# Patient Record
Sex: FEMALE | Race: WHITE | Employment: FULL TIME | URBAN - NONMETROPOLITAN AREA
[De-identification: names, ages, dates, MRNs, and addresses within clinical notes are randomized per-mention and may not be internally consistent; named-entity substitution may affect disease eponyms.]

---

## 2017-11-30 ENCOUNTER — HOSPITAL ENCOUNTER (OUTPATIENT)
Age: 22
Discharge: HOME | End: 2017-11-30
Payer: COMMERCIAL

## 2017-11-30 LAB
ALBUMIN SERPL-MCNC: 4.2 G/DL (ref 3.5–5.1)
ALP BLD-CCNC: 64 U/L (ref 38–126)
ALT SERPL-CCNC: 12 U/L (ref 11–66)
ANION GAP SERPL CALCULATED.3IONS-SCNC: 14 MEQ/L (ref 8–16)
ANISOCYTOSIS: ABNORMAL
AST SERPL-CCNC: 12 U/L (ref 5–40)
AVERAGE GLUCOSE: 78 MG/DL (ref 70–126)
BASOPHILS # BLD: 0.4 %
BASOPHILS ABSOLUTE: 0 THOU/MM3 (ref 0–0.1)
BILIRUB SERPL-MCNC: 0.6 MG/DL (ref 0.3–1.2)
BUN BLDV-MCNC: 12 MG/DL (ref 7–22)
CALCIUM SERPL-MCNC: 9.3 MG/DL (ref 8.5–10.5)
CHLORIDE BLD-SCNC: 104 MEQ/L (ref 98–111)
CHOLESTEROL, TOTAL: 141 MG/DL (ref 100–199)
CO2: 23 MEQ/L (ref 23–33)
CREAT SERPL-MCNC: 0.7 MG/DL (ref 0.4–1.2)
EOSINOPHIL # BLD: 2 %
EOSINOPHILS ABSOLUTE: 0.1 THOU/MM3 (ref 0–0.4)
GFR SERPL CREATININE-BSD FRML MDRD: > 90 ML/MIN/1.73M2
GLUCOSE BLD-MCNC: 91 MG/DL (ref 70–108)
HBA1C MFR BLD: 4.6 % (ref 4.4–6.4)
HCT VFR BLD CALC: 29.1 % (ref 37–47)
HDLC SERPL-MCNC: 53 MG/DL
HEMOGLOBIN: 9.3 GM/DL (ref 12–16)
HYPOCHROMIA: ABNORMAL
INSULIN, RANDOM OR FASTING: NORMAL
LDL CHOLESTEROL CALCULATED: 75 MG/DL
LYMPHOCYTES # BLD: 29.2 %
LYMPHOCYTES ABSOLUTE: 1.5 THOU/MM3 (ref 1–4.8)
MCH RBC QN AUTO: 25 PG (ref 27–31)
MCHC RBC AUTO-ENTMCNC: 32.1 GM/DL (ref 33–37)
MCV RBC AUTO: 77.7 FL (ref 81–99)
MICROCYTES: ABNORMAL
MONOCYTES # BLD: 8.2 %
MONOCYTES ABSOLUTE: 0.4 THOU/MM3 (ref 0.4–1.3)
NUCLEATED RED BLOOD CELLS: 0 /100 WBC
PDW BLD-RTO: 16.3 % (ref 11.5–14.5)
PLATELET # BLD: 175 THOU/MM3 (ref 130–400)
PMV BLD AUTO: 9.6 MCM (ref 7.4–10.4)
POTASSIUM SERPL-SCNC: 4.1 MEQ/L (ref 3.5–5.2)
PROLACTIN: 13.6 NG/ML
RBC # BLD: 3.74 MILL/MM3 (ref 4.2–5.4)
SEG NEUTROPHILS: 60.2 %
SEGMENTED NEUTROPHILS ABSOLUTE COUNT: 3 THOU/MM3 (ref 1.8–7.7)
SODIUM BLD-SCNC: 141 MEQ/L (ref 135–145)
T4 FREE: 1.43 NG/DL (ref 0.93–1.76)
TOTAL PROTEIN: 7.1 G/DL (ref 6.1–8)
TRIGL SERPL-MCNC: 64 MG/DL (ref 0–199)
TSH SERPL DL<=0.05 MIU/L-ACNC: 2.89 UIU/ML (ref 0.4–4.2)
WBC # BLD: 5 THOU/MM3 (ref 4.8–10.8)

## 2017-11-30 PROCEDURE — 84146 ASSAY OF PROLACTIN: CPT

## 2017-11-30 PROCEDURE — 80061 LIPID PANEL: CPT

## 2017-11-30 PROCEDURE — 83525 ASSAY OF INSULIN: CPT

## 2017-11-30 PROCEDURE — 84439 ASSAY OF FREE THYROXINE: CPT

## 2017-11-30 PROCEDURE — 83036 HEMOGLOBIN GLYCOSYLATED A1C: CPT

## 2017-11-30 PROCEDURE — 80050 GENERAL HEALTH PANEL: CPT

## 2017-11-30 PROCEDURE — 36415 COLL VENOUS BLD VENIPUNCTURE: CPT

## 2018-08-17 ENCOUNTER — HOSPITAL ENCOUNTER (OUTPATIENT)
Age: 23
Discharge: HOME | End: 2018-08-17
Payer: COMMERCIAL

## 2018-08-17 LAB
BASOPHILS # BLD: 0.6 %
BASOPHILS ABSOLUTE: 0 THOU/MM3 (ref 0–0.1)
EOSINOPHIL # BLD: 2.3 %
EOSINOPHILS ABSOLUTE: 0.1 THOU/MM3 (ref 0–0.4)
ERYTHROCYTE [DISTWIDTH] IN BLOOD BY AUTOMATED COUNT: 12.8 % (ref 11.5–14.5)
ERYTHROCYTE [DISTWIDTH] IN BLOOD BY AUTOMATED COUNT: 44.7 FL (ref 35–45)
GLUCOSE FASTING: 91 MG/DL (ref 70–108)
HCT VFR BLD CALC: 45 % (ref 37–47)
HEMOGLOBIN: 15.4 GM/DL (ref 12–16)
IMMATURE GRANS (ABS): 0.01 THOU/MM3 (ref 0–0.07)
IMMATURE GRANULOCYTES: 0.2 %
INSULIN, RANDOM OR FASTING: NORMAL
LYMPHOCYTES # BLD: 32.1 %
LYMPHOCYTES ABSOLUTE: 1.7 THOU/MM3 (ref 1–4.8)
MCH RBC QN AUTO: 32.7 PG (ref 26–33)
MCHC RBC AUTO-ENTMCNC: 34.2 GM/DL (ref 32.2–35.5)
MCV RBC AUTO: 95.5 FL (ref 81–99)
MONOCYTES # BLD: 7.6 %
MONOCYTES ABSOLUTE: 0.4 THOU/MM3 (ref 0.4–1.3)
NUCLEATED RED BLOOD CELLS: 0 /100 WBC
PLATELET # BLD: 162 THOU/MM3 (ref 130–400)
PMV BLD AUTO: 11.5 FL (ref 9.4–12.4)
RBC # BLD: 4.71 MILL/MM3 (ref 4.2–5.4)
SEG NEUTROPHILS: 57.2 %
SEGMENTED NEUTROPHILS ABSOLUTE COUNT: 3 THOU/MM3 (ref 1.8–7.7)
WBC # BLD: 5.2 THOU/MM3 (ref 4.8–10.8)

## 2018-08-17 PROCEDURE — 36415 COLL VENOUS BLD VENIPUNCTURE: CPT

## 2018-08-17 PROCEDURE — 85025 COMPLETE CBC W/AUTO DIFF WBC: CPT

## 2018-08-17 PROCEDURE — 83525 ASSAY OF INSULIN: CPT

## 2018-08-17 PROCEDURE — 82947 ASSAY GLUCOSE BLOOD QUANT: CPT

## 2022-04-13 ENCOUNTER — HOSPITAL ENCOUNTER (EMERGENCY)
Age: 27
Discharge: HOME | End: 2022-04-13
Attending: EMERGENCY MEDICINE
Payer: COMMERCIAL

## 2022-04-13 ENCOUNTER — APPOINTMENT (OUTPATIENT)
Dept: CT IMAGING | Age: 27
End: 2022-04-13
Payer: COMMERCIAL

## 2022-04-13 VITALS
TEMPERATURE: 96.3 F | DIASTOLIC BLOOD PRESSURE: 61 MMHG | WEIGHT: 270 LBS | RESPIRATION RATE: 16 BRPM | HEART RATE: 90 BPM | BODY MASS INDEX: 38.65 KG/M2 | HEIGHT: 70 IN | SYSTOLIC BLOOD PRESSURE: 110 MMHG | OXYGEN SATURATION: 97 %

## 2022-04-13 DIAGNOSIS — R10.9 RIGHT FLANK PAIN: ICD-10-CM

## 2022-04-13 DIAGNOSIS — N20.1 RIGHT DISTAL URETERAL CALCULUS: ICD-10-CM

## 2022-04-13 DIAGNOSIS — N20.0 KIDNEY STONE: Primary | ICD-10-CM

## 2022-04-13 LAB
ALBUMIN SERPL-MCNC: 3.5 GM/DL (ref 3.4–5)
ALP BLD-CCNC: 49 U/L (ref 46–116)
ALT SERPL-CCNC: 27 U/L (ref 14–63)
AMORPHOUS: ABNORMAL
ANION GAP: 10 MEQ/L (ref 8–16)
AST SERPL-CCNC: 17 U/L (ref 15–37)
BACTERIA: ABNORMAL
BASOPHILS # BLD: 0.7 % (ref 0–3)
BILIRUB SERPL-MCNC: 0.4 MG/DL (ref 0.2–1)
BILIRUBIN URINE: ABNORMAL
BLOOD, URINE: ABNORMAL
BUN BLDV-MCNC: 12 MG/DL (ref 7–18)
CASTS UA: ABNORMAL /LPF
CHARACTER, URINE: ABNORMAL
CHLORIDE BLD-SCNC: 104 MEQ/L (ref 98–107)
CO2: 25 MEQ/L (ref 21–32)
COLOR: YELLOW
CREAT SERPL-MCNC: 1 MG/DL (ref 0.6–1.3)
CRYSTALS, UA: ABNORMAL
EOSINOPHILS RELATIVE PERCENT: 2.2 % (ref 0–4)
EPITHELIAL CELLS, UA: ABNORMAL /HPF
GFR, ESTIMATED: 72 ML/MIN/1.73M2
GLUCOSE BLD-MCNC: 111 MG/DL (ref 74–106)
GLUCOSE, URINE: NEGATIVE MG/DL
HCT VFR BLD CALC: 42.5 % (ref 37–47)
HEMOGLOBIN: 14.7 GM/DL (ref 12–16)
KETONES, URINE: ABNORMAL
LEUKOCYTE ESTERASE, URINE: ABNORMAL
LYMPHOCYTES # BLD: 27 % (ref 15–47)
MAGNESIUM: 2 MG/DL (ref 1.8–2.4)
MCH RBC QN AUTO: 32.6 PG (ref 27–31)
MCHC RBC AUTO-ENTMCNC: 34.5 GM/DL (ref 33–37)
MCV RBC AUTO: 94.4 FL (ref 81–99)
MONOCYTES: 7.3 % (ref 0–12)
MUCUS: ABNORMAL
NITRITE, URINE: NEGATIVE
PDW BLD-RTO: 11.9 % (ref 11.5–14.5)
PH UA: 5.5 (ref 5–9)
PLATELET # BLD: 142 THOU/MM3 (ref 130–400)
PMV BLD AUTO: 8.3 FL (ref 7.4–10.4)
POC CALCIUM: 8.6 MG/DL (ref 8.5–10.1)
POTASSIUM SERPL-SCNC: 3.3 MEQ/L (ref 3.5–5.1)
PREGNANCY, URINE: NEGATIVE
PROTEIN UA: 30 MG/DL
RBC # BLD: 4.5 MILL/MM3 (ref 4.2–5.4)
RBC UA: ABNORMAL /HPF
REFLEX TO URINE C & S: ABNORMAL
SEGS: 62.8 % (ref 43–75)
SODIUM BLD-SCNC: 139 MEQ/L (ref 136–145)
SPECIFIC GRAVITY UA: 1.03 (ref 1–1.03)
TOTAL PROTEIN: 7.3 GM/DL (ref 6.4–8.2)
UROBILINOGEN, URINE: 0.2 EU/DL (ref 0–1)
WBC # BLD: 8 THOU/MM3 (ref 4.8–10.8)
WBC UA: ABNORMAL /HPF

## 2022-04-13 PROCEDURE — 6360000002 HC RX W HCPCS: Performed by: EMERGENCY MEDICINE

## 2022-04-13 PROCEDURE — 80053 COMPREHEN METABOLIC PANEL: CPT

## 2022-04-13 PROCEDURE — 99284 EMERGENCY DEPT VISIT MOD MDM: CPT

## 2022-04-13 PROCEDURE — 74176 CT ABD & PELVIS W/O CONTRAST: CPT

## 2022-04-13 PROCEDURE — 81001 URINALYSIS AUTO W/SCOPE: CPT

## 2022-04-13 PROCEDURE — 96374 THER/PROPH/DIAG INJ IV PUSH: CPT

## 2022-04-13 PROCEDURE — 96375 TX/PRO/DX INJ NEW DRUG ADDON: CPT

## 2022-04-13 PROCEDURE — 84703 CHORIONIC GONADOTROPIN ASSAY: CPT

## 2022-04-13 PROCEDURE — 83735 ASSAY OF MAGNESIUM: CPT

## 2022-04-13 PROCEDURE — 2580000003 HC RX 258: Performed by: EMERGENCY MEDICINE

## 2022-04-13 PROCEDURE — 85025 COMPLETE CBC W/AUTO DIFF WBC: CPT

## 2022-04-13 RX ORDER — KETOROLAC TROMETHAMINE 30 MG/ML
15 INJECTION, SOLUTION INTRAMUSCULAR; INTRAVENOUS ONCE
Status: COMPLETED | OUTPATIENT
Start: 2022-04-13 | End: 2022-04-13

## 2022-04-13 RX ORDER — 0.9 % SODIUM CHLORIDE 0.9 %
1000 INTRAVENOUS SOLUTION INTRAVENOUS ONCE
Status: COMPLETED | OUTPATIENT
Start: 2022-04-13 | End: 2022-04-13

## 2022-04-13 RX ORDER — OXYCODONE HYDROCHLORIDE AND ACETAMINOPHEN 5; 325 MG/1; MG/1
1 TABLET ORAL EVERY 6 HOURS PRN
Qty: 10 TABLET | Refills: 0 | Status: SHIPPED | OUTPATIENT
Start: 2022-04-13 | End: 2022-04-16

## 2022-04-13 RX ADMIN — HYDROMORPHONE HYDROCHLORIDE 1 MG: 1 INJECTION, SOLUTION INTRAMUSCULAR; INTRAVENOUS; SUBCUTANEOUS at 14:35

## 2022-04-13 RX ADMIN — KETOROLAC TROMETHAMINE 15 MG: 30 INJECTION, SOLUTION INTRAMUSCULAR at 14:33

## 2022-04-13 RX ADMIN — SODIUM CHLORIDE 1000 ML: 9 INJECTION, SOLUTION INTRAVENOUS at 14:34

## 2022-04-13 ASSESSMENT — ENCOUNTER SYMPTOMS
VOMITING: 1
SHORTNESS OF BREATH: 0
BLOOD IN STOOL: 0
ABDOMINAL PAIN: 1
SORE THROAT: 0
EYE DISCHARGE: 0
NAUSEA: 1
EYE PAIN: 0
WHEEZING: 0
DIARRHEA: 0

## 2022-04-13 ASSESSMENT — PAIN DESCRIPTION - ORIENTATION: ORIENTATION: RIGHT

## 2022-04-13 ASSESSMENT — PAIN SCALES - GENERAL
PAINLEVEL_OUTOF10: 3
PAINLEVEL_OUTOF10: 8
PAINLEVEL_OUTOF10: 3
PAINLEVEL_OUTOF10: 8

## 2022-04-13 ASSESSMENT — PAIN - FUNCTIONAL ASSESSMENT
PAIN_FUNCTIONAL_ASSESSMENT: 0-10
PAIN_FUNCTIONAL_ASSESSMENT: 0-10

## 2022-04-13 ASSESSMENT — PAIN DESCRIPTION - PAIN TYPE: TYPE: ACUTE PAIN

## 2022-04-13 ASSESSMENT — PAIN DESCRIPTION - LOCATION: LOCATION: ABDOMEN

## 2022-04-13 NOTE — ED NOTES
Pt. Resting quietly on cot, 2/10. Unable to urinate at this time.       Jesus Alberto Ricks RN  04/13/22 1227

## 2022-04-13 NOTE — ED PROVIDER NOTES
[facility]  [address] [address]  Phone: [phone]    eMERGENCY dEPARTMENT eNCOUnter           [address] [facility]       Chief Complaint   Patient presents with    Abdominal Pain    Flank Pain       Nurses Notes reviewed and I agree except as noted in the HPI. HISTORY OF PRESENT ILLNESS    Myrla Holter is a 24 y.o. female who presented via private vehicle with above-mentioned complaints. She presented with 2-hour history of right flank pain. She describes her pain as sharp, severe pain which radiates to the right lower quadrant of the abdomen. Her pain was not triggered exacerbated by anything. She also has 1 week history of mild dysuria urinary frequency. She has no hematuria. She denies fever or chills. She has nausea but no vomiting. She had prior history of UTIs but no kidney stones. REVIEW OF SYSTEMS     Review of Systems   Constitutional: Negative for chills and fever. HENT: Negative for sore throat. Eyes: Negative for pain and discharge. Respiratory: Negative for shortness of breath and wheezing. Cardiovascular: Negative for chest pain and palpitations. Gastrointestinal: Positive for abdominal pain, nausea and vomiting. Negative for blood in stool and diarrhea. Genitourinary: Positive for dysuria, flank pain and frequency. Negative for hematuria. Musculoskeletal: Negative for neck pain and neck stiffness. Neurological: Negative for seizures, syncope and headaches. Psychiatric/Behavioral: Negative for confusion. PAST MEDICAL HISTORY    has no past medical history on file. SURGICAL HISTORY      has no past surgical history on file. CURRENT MEDICATIONS       Previous Medications    AZITHROMYCIN (ZITHROMAX Z-DARA) 250 MG TABLET    500mg x1 day, then 250mg daily x 4 days    METHYLPREDNISOLONE (MEDROL DOSEPACK) 4 MG TABLET    Take as directed. ALLERGIES     is allergic to pcn [penicillins].     FAMILY HISTORY has no family status information on file. family history is not on file. SOCIAL HISTORY      reports that she has never smoked. She has never used smokeless tobacco.    PHYSICAL EXAM     INITIAL VITALS:  height is 5' 10\" (1.778 m) and weight is 270 lb (122.5 kg). Her temperature is 96.3 °F (35.7 °C). Her blood pressure is 110/61 and her pulse is 90. Her respiration is 16 and oxygen saturation is 97%. Physical Exam  Vitals and nursing note reviewed. Constitutional:       General: She is in acute distress. Appearance: She is well-developed. She is diaphoretic. Comments: She looks in obvious pain   HENT:      Head: Atraumatic. Eyes:      Conjunctiva/sclera: Conjunctivae normal.      Pupils: Pupils are equal, round, and reactive to light. Neck:      Thyroid: No thyromegaly. Vascular: No JVD. Trachea: No tracheal deviation. Cardiovascular:      Rate and Rhythm: Normal rate and regular rhythm. Heart sounds: No murmur heard. No friction rub. No gallop. Pulmonary:      Effort: Pulmonary effort is normal.      Breath sounds: Normal breath sounds. Abdominal:      General: Bowel sounds are normal.      Palpations: Abdomen is soft. There is no mass. Tenderness: There is no abdominal tenderness. There is no guarding. Musculoskeletal:      Cervical back: Neck supple. Comments: No CVA tenderness   Neurological:      Mental Status: She is alert. DIFFERENTIAL DIAGNOSIS:       DIAGNOSTIC RESULTS       RADIOLOGY:   CT scan of abdomen and pelvis without contrast showed very small right distal ureteral stone with mild hydronephrosis.   LABS:   Labs Reviewed   URINALYSIS WITH REFLEX TO CULTURE - Abnormal; Notable for the following components:       Result Value    Bilirubin Urine SMALL (*)     Ketones, Urine TRACE (*)     Blood, Urine MODERATE (*)     Protein, UA 30 (*)     Leukocyte Esterase, Urine TRACE (*)     Character, Urine CLOUDY (*)     All other components within normal limits   CBC WITH AUTO DIFFERENTIAL - Abnormal; Notable for the following components:    MCH 32.6 (*)     All other components within normal limits   COMPREHENSIVE METABOLIC PANEL - Abnormal; Notable for the following components:    Glucose 111 (*)     Potassium 3.3 (*)     All other components within normal limits   GLOMERULAR FILTRATION RATE, ESTIMATED - Abnormal; Notable for the following components:    GFR, Estimated 72 (*)     All other components within normal limits   PREGNANCY, URINE   MAGNESIUM   ANION GAP   Laboratory studies were unremarkable    EMERGENCY DEPARTMENT COURSE:   Vitals:    Vitals:    04/13/22 1407 04/13/22 1412 04/13/22 1629   BP: 130/82  110/61   Pulse: 82  90   Resp: 16  16   Temp: 96.3 °F (35.7 °C)     SpO2: 98%  97%   Weight:  270 lb (122.5 kg)    Height:  5' 10\" (1.778 m)      She received 1 L of normal saline IV, Toradol 15 mg IV and Dilaudid 1 mg IV. Reevaluation at 4:30 PM:  She was resting comfortably. She said that her pain was gone. I discussed with her diagnosis and treatment plan. FINAL IMPRESSION      1. Kidney stone    2. Right flank pain    3. Right distal ureteral calculus          DISPOSITION/PLAN   Discharged home in good condition. PATIENT REFERRED TO:  [address] [facility] urology    DISCHARGE MEDICATIONS:  New Prescriptions    OXYCODONE-ACETAMINOPHEN (PERCOCET) 5-325 MG PER TABLET    Take 1 tablet by mouth every 6 hours as needed for Pain for up to 3 days. Intended supply: 3 days.  Take lowest dose possible to manage pain       (Please note that portions of this note were completed with a voice recognition program.  Efforts were made to edit the dictations but occasionally words are mis-transcribed.)    MD Betsy Will MD  04/13/22 6375

## 2022-04-13 NOTE — ED NOTES
AVS rev'd with pt. And copy given with urine strainer. Pulse regular. Extremities warm. Respirations regular and quiet. Mucous membranes pink & moist. Alert and oriented times 3. No nausea or vomiting. Range of motion within patient's limits. Skin pink, warm and dry. Calm and cooperative.      Yaw Garcia RN  04/13/22 6452

## 2022-04-13 NOTE — ED NOTES
Pt. Resting quietly on cot, SR up x 1, call light in reach. Skin cool and dry.      Corinne Marmolejo RN  04/13/22 0475

## 2022-04-13 NOTE — ED TRIAGE NOTES
Pt. Presents ambulatory to ED with c/o rt. Lower abd. Pain that radiates into rt. Lower back, c/o dysuria for past week, denies fever. Pt. To bathroom, unable to provide urine specimen at this time. Pt. Voices she started z-perry yesterday for \"sinus problems\". Skin warm and clammy. Pt. Attempted CCMS, unsuccessful.

## 2022-04-18 ENCOUNTER — OFFICE VISIT (OUTPATIENT)
Dept: UROLOGY | Age: 27
End: 2022-04-18
Payer: COMMERCIAL

## 2022-04-18 VITALS
SYSTOLIC BLOOD PRESSURE: 120 MMHG | WEIGHT: 270 LBS | HEIGHT: 70 IN | BODY MASS INDEX: 38.65 KG/M2 | DIASTOLIC BLOOD PRESSURE: 72 MMHG

## 2022-04-18 DIAGNOSIS — N20.0 KIDNEY STONE: Primary | ICD-10-CM

## 2022-04-18 DIAGNOSIS — R10.9 FLANK PAIN: ICD-10-CM

## 2022-04-18 LAB
BILIRUBIN URINE: NEGATIVE
BLOOD URINE, POC: ABNORMAL
CHARACTER, URINE: CLEAR
COLOR, URINE: YELLOW
GLUCOSE URINE: NEGATIVE MG/DL
KETONES, URINE: NEGATIVE
LEUKOCYTE CLUMPS, URINE: NEGATIVE
NITRITE, URINE: NEGATIVE
PH, URINE: 7 (ref 5–9)
PROTEIN, URINE: NEGATIVE MG/DL
SPECIFIC GRAVITY, URINE: 1.02 (ref 1–1.03)
UROBILINOGEN, URINE: 0.2 EU/DL (ref 0–1)

## 2022-04-18 PROCEDURE — G8417 CALC BMI ABV UP PARAM F/U: HCPCS | Performed by: UROLOGY

## 2022-04-18 PROCEDURE — 81003 URINALYSIS AUTO W/O SCOPE: CPT | Performed by: UROLOGY

## 2022-04-18 PROCEDURE — 99204 OFFICE O/P NEW MOD 45 MIN: CPT | Performed by: UROLOGY

## 2022-04-18 PROCEDURE — G8427 DOCREV CUR MEDS BY ELIG CLIN: HCPCS | Performed by: UROLOGY

## 2022-04-18 PROCEDURE — 1036F TOBACCO NON-USER: CPT | Performed by: UROLOGY

## 2022-04-18 NOTE — PROGRESS NOTES
Dr. Rhoda Quinteros MD  [facility])  Urology Clinic  New Patient Visit      Patient:  Byron Gibson  YOB: 1995  Date: 4/18/2022    HISTORY OF PRESENT ILLNESS:   The patient is a 24 y.o. female who presents today for evaluation of the following problems: Distal 4mm ureteral stone. Sister had stones. Overall the problem(s) : show no change. Associated Symptoms: No dysuria, gross hematuria. Pain Severity: 0/10    Summary of old records:   Right distal ureteral stone. 4mm. Imaging/Labs reviewed during today's visit:  I have independently reviewed and verified the following films during today's visit. CT scan was reviewed w her today. Urinalysis today:  No results found for this visit on 04/18/22. Last BUN and creatinine:  Lab Results   Component Value Date    BUN 12 04/13/2022     Lab Results   Component Value Date    CREATININE 1.0 04/13/2022       Imaging Reviewed during this Office Visit:   (results were independently reviewed by physician and radiology report verified)    PAST MEDICAL, FAMILY AND SOCIAL HISTORY:  No past medical history on file. No past surgical history on file. No family history on file. No outpatient medications have been marked as taking for the 4/18/22 encounter (Office Visit) with Rhoda Quinteros MD.       Pcn [penicillins]  Social History     Tobacco Use   Smoking Status Never Smoker   Smokeless Tobacco Never Used       Social History     Substance and Sexual Activity   Alcohol Use None       REVIEW OF SYSTEMS:  Constitutional: negative  Eyes: negative  Respiratory: negative  Cardiovascular: negative  Gastrointestinal: negative  Genitourinary: negative except for what is in HPI  Musculoskeletal: negative  Skin: negative   Neurological: negative  Hematological/Lymphatic: negative  Psychological: negative    Physical Exam:    This a 24 y.o. female      Vitals:    04/18/22 0817   BP: 120/72     Constitutional: Patient in no acute distress.   Neuro: Alert and oriented to person, place and time. Psych: mood and affect normal  HEENT negative  Lungs: Respiratory effort is normal  Cardiovascular: Normal peripheral pulses  Abdomen: Soft, non-tender, non-distended with no CVA, flank pain or hepatosplenomegaly. No hernias. Kidneys normal.  Lymphatics: No palpable lymphadenopathy. Bladder non-tender and not distended. Assessment and Plan      1. Kidney stone    2. Flank pain           Plan:      No follow-ups on file. Will check litholink. Doing well. Likely has not passed stone first. Continue straining. Discussed flomax but will hold off. See back in 6-8 with litholink.          Dr. Dereje Friedman MD

## 2022-06-01 ENCOUNTER — OFFICE VISIT (OUTPATIENT)
Dept: UROLOGY | Age: 27
End: 2022-06-01
Payer: COMMERCIAL

## 2022-06-01 VITALS — WEIGHT: 266.4 LBS | RESPIRATION RATE: 12 BRPM | HEIGHT: 70 IN | BODY MASS INDEX: 38.14 KG/M2

## 2022-06-01 DIAGNOSIS — N20.0 KIDNEY STONE: Primary | ICD-10-CM

## 2022-06-01 DIAGNOSIS — R82.993 HYPERURICOSURIA: ICD-10-CM

## 2022-06-01 PROCEDURE — 1036F TOBACCO NON-USER: CPT | Performed by: UROLOGY

## 2022-06-01 PROCEDURE — 99214 OFFICE O/P EST MOD 30 MIN: CPT | Performed by: UROLOGY

## 2022-06-01 PROCEDURE — G8427 DOCREV CUR MEDS BY ELIG CLIN: HCPCS | Performed by: UROLOGY

## 2022-06-01 PROCEDURE — G8417 CALC BMI ABV UP PARAM F/U: HCPCS | Performed by: UROLOGY

## 2022-06-01 RX ORDER — NORETHINDRONE AND ETHINYL ESTRADIOL 0.4-0.035
KIT ORAL
COMMUNITY
Start: 2022-05-20

## 2022-06-01 NOTE — PROGRESS NOTES
Dr. Yue Archer MD  [facility])  Urology Clinic  New Patient Visit      Patient:  Haider Kurtz  YOB: 1995  Date: 6/1/2022    HISTORY OF PRESENT ILLNESS:   The patient is a 24 y.o. female who presents today for evaluation of the following problems: Distal 4mm ureteral stone. Sister had stones. Overall the problem(s) : show no change. Associated Symptoms: No dysuria, gross hematuria. Pain Severity: 0/10    Summary of old records:   Right distal ureteral stone. 4mm. 2022 2022 litholink showed very high uric acid and otherwise normal except borderline NA. Imaging/Labs reviewed during today's visit:  I have independently reviewed and verified the following films during today's visit. Litholink     Urinalysis today:  No results found for this visit on 06/01/22. Last BUN and creatinine:  Lab Results   Component Value Date    BUN 12 04/13/2022     Lab Results   Component Value Date    CREATININE 1.0 04/13/2022       Imaging Reviewed during this Office Visit:   (results were independently reviewed by physician and radiology report verified)    PAST MEDICAL, FAMILY AND SOCIAL HISTORY:  No past medical history on file. No past surgical history on file. No family history on file. Outpatient Medications Marked as Taking for the 6/1/22 encounter (Office Visit) with Yue Archer MD   Medication Sig Dispense Refill    BRIELLYN 0.4-35 MG-MCG per tablet TAKE 1 TABLET BY ORAL ROUTE ONCE DAILY FOR 84 DAYS               . ..  (REFER TO PRESCRIPTION NOTES).          Pcn [penicillins]  Social History     Tobacco Use   Smoking Status Never Smoker   Smokeless Tobacco Never Used       Social History     Substance and Sexual Activity   Alcohol Use None       REVIEW OF SYSTEMS:  Constitutional: negative  Eyes: negative  Respiratory: negative  Cardiovascular: negative  Gastrointestinal: negative  Genitourinary: negative except for what is in HPI  Musculoskeletal: negative  Skin: negative Neurological: negative  Hematological/Lymphatic: negative  Psychological: negative    Physical Exam:    This a 24 y.o. female      Vitals:    06/01/22 0748   Resp: 12     Constitutional: Patient in no acute distress. Neuro: Alert and oriented to person, place and time. Psych: mood and affect normal  HEENT negative  Lungs: Respiratory effort is normal  Cardiovascular: Normal peripheral pulses  Abdomen: Soft, non-tender, non-distended with no CVA, flank pain or hepatosplenomegaly. No hernias. Kidneys normal.  Lymphatics: No palpable lymphadenopathy. Bladder non-tender and not distended. Assessment and Plan      1. Kidney stone    2. Hyperuricosuria           Plan:      No follow-ups on file. Low salt diet. Watch animal proteins  Discussed allopurinol. See back in 1 year with KUB.           Dr. Ramu Espinoza MD

## 2023-04-07 ENCOUNTER — HOSPITAL ENCOUNTER (OUTPATIENT)
Age: 28
Discharge: HOME | End: 2023-04-07
Payer: COMMERCIAL

## 2023-04-07 DIAGNOSIS — G43.109 MIGRAINE WITH AURA AND WITHOUT STATUS MIGRAINOSUS, NOT INTRACTABLE: ICD-10-CM

## 2023-04-07 LAB
ALBUMIN SERPL BCG-MCNC: 4.2 G/DL (ref 3.5–5.1)
ALP SERPL-CCNC: 51 U/L (ref 38–126)
ALT SERPL W/O P-5'-P-CCNC: 16 U/L (ref 11–66)
ANION GAP SERPL CALC-SCNC: 10 MEQ/L (ref 8–16)
AST SERPL-CCNC: 16 U/L (ref 5–40)
BASOPHILS ABSOLUTE: 0 THOU/MM3 (ref 0–0.1)
BASOPHILS NFR BLD AUTO: 0.7 %
BILIRUB SERPL-MCNC: 0.6 MG/DL (ref 0.3–1.2)
BUN SERPL-MCNC: 9 MG/DL (ref 7–22)
CALCIUM SERPL-MCNC: 9.3 MG/DL (ref 8.5–10.5)
CHLORIDE SERPL-SCNC: 105 MEQ/L (ref 98–111)
CO2 SERPL-SCNC: 26 MEQ/L (ref 23–33)
CREAT SERPL-MCNC: 0.7 MG/DL (ref 0.4–1.2)
DEPRECATED RDW RBC AUTO: 42.5 FL (ref 35–45)
EOSINOPHIL NFR BLD AUTO: 0.8 %
EOSINOPHILS ABSOLUTE: 0 THOU/MM3 (ref 0–0.4)
ERYTHROCYTE [DISTWIDTH] IN BLOOD BY AUTOMATED COUNT: 12.2 % (ref 11.5–14.5)
GFR SERPL CREATININE-BSD FRML MDRD: > 60 ML/MIN/1.73M2
GLUCOSE SERPL-MCNC: 90 MG/DL (ref 70–108)
HCT VFR BLD AUTO: 46.5 % (ref 37–47)
HGB BLD-MCNC: 15.6 GM/DL (ref 12–16)
IMM GRANULOCYTES # BLD AUTO: 0 THOU/MM3 (ref 0–0.07)
IMM GRANULOCYTES NFR BLD AUTO: 0 %
LYMPHOCYTES ABSOLUTE: 1.7 THOU/MM3 (ref 1–4.8)
LYMPHOCYTES NFR BLD AUTO: 28.7 %
MCH RBC QN AUTO: 32 PG (ref 26–33)
MCHC RBC AUTO-ENTMCNC: 33.5 GM/DL (ref 32.2–35.5)
MCV RBC AUTO: 95.3 FL (ref 81–99)
MONOCYTES ABSOLUTE: 0.4 THOU/MM3 (ref 0.4–1.3)
MONOCYTES NFR BLD AUTO: 7.1 %
NEUTROPHILS NFR BLD AUTO: 62.7 %
NRBC BLD AUTO-RTO: 0 /100 WBC
PLATELET # BLD AUTO: 164 THOU/MM3 (ref 130–400)
PMV BLD AUTO: 11.2 FL (ref 9.4–12.4)
POTASSIUM SERPL-SCNC: 3.9 MEQ/L (ref 3.5–5.2)
PROT SERPL-MCNC: 6.9 G/DL (ref 6.1–8)
RBC # BLD AUTO: 4.88 MILL/MM3 (ref 4.2–5.4)
SEGMENTED NEUTROPHILS ABSOLUTE COUNT: 3.7 THOU/MM3 (ref 1.8–7.7)
SODIUM SERPL-SCNC: 141 MEQ/L (ref 135–145)
TSH SERPL DL<=0.005 MIU/L-ACNC: 1.44 UIU/ML (ref 0.4–4.2)
WBC # BLD AUTO: 5.9 THOU/MM3 (ref 4.8–10.8)

## 2023-04-07 PROCEDURE — 80053 COMPREHEN METABOLIC PANEL: CPT

## 2023-04-07 PROCEDURE — 36415 COLL VENOUS BLD VENIPUNCTURE: CPT

## 2023-04-07 PROCEDURE — 84443 ASSAY THYROID STIM HORMONE: CPT

## 2023-04-07 PROCEDURE — 85025 COMPLETE CBC W/AUTO DIFF WBC: CPT

## 2023-08-11 ENCOUNTER — HOSPITAL ENCOUNTER (OUTPATIENT)
Dept: MRI IMAGING | Age: 28
Discharge: HOME | End: 2023-08-11
Payer: COMMERCIAL

## 2023-08-11 DIAGNOSIS — G43.109 MIGRAINE WITH AURA AND WITHOUT STATUS MIGRAINOSUS, NOT INTRACTABLE: ICD-10-CM

## 2023-08-11 PROCEDURE — 6360000004 HC RX CONTRAST MEDICATION: Performed by: NURSE PRACTITIONER

## 2023-08-11 PROCEDURE — A9579 GAD-BASE MR CONTRAST NOS,1ML: HCPCS | Performed by: NURSE PRACTITIONER

## 2023-08-11 PROCEDURE — 70553 MRI BRAIN STEM W/O & W/DYE: CPT

## 2023-08-11 RX ADMIN — GADOTERIDOL 20 ML: 279.3 INJECTION, SOLUTION INTRAVENOUS at 13:12

## 2023-12-02 ENCOUNTER — HOSPITAL ENCOUNTER (EMERGENCY)
Age: 28
Discharge: HOME | End: 2023-12-02
Payer: COMMERCIAL

## 2023-12-02 VITALS
HEART RATE: 90 BPM | RESPIRATION RATE: 18 BRPM | TEMPERATURE: 97.8 F | DIASTOLIC BLOOD PRESSURE: 90 MMHG | OXYGEN SATURATION: 100 % | SYSTOLIC BLOOD PRESSURE: 136 MMHG

## 2023-12-02 DIAGNOSIS — J02.0 STREPTOCOCCAL SORE THROAT: Primary | ICD-10-CM

## 2023-12-02 LAB — S PYO AG THROAT QL: POSITIVE

## 2023-12-02 PROCEDURE — 99213 OFFICE O/P EST LOW 20 MIN: CPT

## 2023-12-02 PROCEDURE — 87651 STREP A DNA AMP PROBE: CPT

## 2023-12-02 RX ORDER — CLINDAMYCIN HYDROCHLORIDE 300 MG/1
300 CAPSULE ORAL 3 TIMES DAILY
Qty: 30 CAPSULE | Refills: 0 | Status: SHIPPED | OUTPATIENT
Start: 2023-12-02 | End: 2023-12-12

## 2023-12-02 ASSESSMENT — PAIN DESCRIPTION - PAIN TYPE: TYPE: ACUTE PAIN

## 2023-12-02 ASSESSMENT — PAIN SCALES - GENERAL: PAINLEVEL_OUTOF10: 7

## 2023-12-02 ASSESSMENT — PAIN DESCRIPTION - LOCATION: LOCATION: THROAT

## 2023-12-02 ASSESSMENT — ENCOUNTER SYMPTOMS
SORE THROAT: 1
DIARRHEA: 0
NAUSEA: 0
VOMITING: 0

## 2023-12-02 ASSESSMENT — PAIN DESCRIPTION - FREQUENCY: FREQUENCY: CONTINUOUS

## 2023-12-02 ASSESSMENT — PAIN - FUNCTIONAL ASSESSMENT: PAIN_FUNCTIONAL_ASSESSMENT: 0-10

## 2023-12-02 ASSESSMENT — PAIN DESCRIPTION - DESCRIPTORS: DESCRIPTORS: SHARP

## 2023-12-02 NOTE — ED PROVIDER NOTES
[facility]  Urgent Care Encounter       CHIEF COMPLAINT       Chief Complaint   Patient presents with    Sore Throat     Onset last night        Nurses Notes reviewed and I agree except as noted in the HPI. HISTORY OF PRESENT ILLNESS   Fabiola Cunha is a 34 y.o. female who presents with complaints of sore throat that started last night. Pt reports pain 7/10 at this time. The history is provided by the patient. REVIEW OF SYSTEMS     Review of Systems   Constitutional:  Negative for fatigue and fever. HENT:  Positive for sore throat. Gastrointestinal:  Negative for diarrhea, nausea and vomiting. Hematological:  Positive for adenopathy. All other systems reviewed and are negative. PAST MEDICAL HISTORY   History reviewed. No pertinent past medical history. SURGICALHISTORY     Patient  has no past surgical history on file. CURRENT MEDICATIONS       Previous Medications    BRIELLYN 0.4-35 MG-MCG PER TABLET    TAKE 1 TABLET BY ORAL ROUTE ONCE DAILY FOR 84 DAYS               . ..  (REFER TO PRESCRIPTION NOTES). ALLERGIES     Patient is is allergic to pcn [penicillins].     Patients   Immunization History   Administered Date(s) Administered    DTaP vaccine 1995, 1995, 1995, 09/15/1998, 04/18/2002    Hep A, HAVRIX, VAQTA, (age 17m-24y), IM, 0.5mL 08/05/2016    Hep A, HAVRIX, VAQTA, (age 19y+), IM, 1mL 05/26/2017    Hep B, ENGERIX-B, RECOMBIVAX-HB, (age Birth - 22y), IM, 0.5mL 1995, 1995, 1997    Hib vaccine 1995, 1995, 1995, 09/15/1998    MMR, PRIORIX, M-M-R II, (age 12m+), SC, 0.5mL 06/17/1998, 04/18/2002    Meningococcal ACWY, MENACTRA (MenACWY-D), (age 10m-48y), IM, 0.5mL 07/28/2010, 08/05/2016    Pneumococcal Conjugate 7-valent (Terisa Ly) 07/25/2001    Polio Virus Vaccine 1995, 1997, 09/15/1998, 04/18/2002    TDaP, ADACEL (age 6y-58y), BOOSTRIX (age 10y+), IM, 0.5mL 08/19/2010, 08/03/2023 Temporal   SpO2: 100%       Medications - No data to display         PROCEDURES:  None    FINAL IMPRESSION      1. Streptococcal sore throat          DISPOSITION/ PLAN     Pt diagnosed with strep throat. Pt educated to take antibiotic as prescribed, discard toothbrush after 24 hours of antibiotics, salt water gurgles, tylenol, motrin. Follow up with PCP. ER for worsening symptoms.        PATIENT REFERRED TO:  FER Levy CNP  [address] [facility] [address]      DISCHARGE MEDICATIONS:  New Prescriptions    CLINDAMYCIN (CLEOCIN) 300 MG CAPSULE    Take 1 capsule by mouth 3 times daily for 10 days       Discontinued Medications    No medications on file       Current Discharge Medication List          FER Nunez CNP    (Please note that portions of this note were completed with a voice recognition program. Efforts were made to edit the dictations but occasionally words are mis-transcribed.)            FER Nunez CNP  12/02/23 8063

## 2023-12-02 NOTE — ED NOTES
Patient discharge instructions given to pt and pt verbalized understanding, 1 px given, no other needs at this time, and pt left in stable condition.       Lit Jackson RN  12/02/23 0795

## 2024-05-22 ENCOUNTER — APPOINTMENT (OUTPATIENT)
Dept: CT IMAGING | Age: 29
End: 2024-05-22
Payer: COMMERCIAL

## 2024-05-22 ENCOUNTER — APPOINTMENT (OUTPATIENT)
Dept: ULTRASOUND IMAGING | Age: 29
End: 2024-05-22
Payer: COMMERCIAL

## 2024-05-22 ENCOUNTER — HOSPITAL ENCOUNTER (EMERGENCY)
Age: 29
Discharge: HOME | End: 2024-05-23
Payer: COMMERCIAL

## 2024-05-22 VITALS
SYSTOLIC BLOOD PRESSURE: 163 MMHG | RESPIRATION RATE: 16 BRPM | HEART RATE: 92 BPM | TEMPERATURE: 98.3 F | OXYGEN SATURATION: 98 % | DIASTOLIC BLOOD PRESSURE: 78 MMHG

## 2024-05-22 DIAGNOSIS — N39.0 URINARY TRACT INFECTION WITH HEMATURIA, SITE UNSPECIFIED: ICD-10-CM

## 2024-05-22 DIAGNOSIS — R03.0 ELEVATED BLOOD PRESSURE READING IN OFFICE WITHOUT DIAGNOSIS OF HYPERTENSION: ICD-10-CM

## 2024-05-22 DIAGNOSIS — R31.9 URINARY TRACT INFECTION WITH HEMATURIA, SITE UNSPECIFIED: ICD-10-CM

## 2024-05-22 DIAGNOSIS — T14.8XXA MUSCLE STRAIN: ICD-10-CM

## 2024-05-22 DIAGNOSIS — N93.8 DUB (DYSFUNCTIONAL UTERINE BLEEDING): Primary | ICD-10-CM

## 2024-05-22 LAB
ALBUMIN SERPL BCG-MCNC: 4.1 G/DL (ref 3.5–5.1)
ALP SERPL-CCNC: 54 U/L (ref 38–126)
ALT SERPL W/O P-5'-P-CCNC: 16 U/L (ref 11–66)
ANION GAP SERPL CALC-SCNC: 11 MEQ/L (ref 8–16)
AST SERPL-CCNC: 19 U/L (ref 5–40)
B-HCG SERPL QL: NEGATIVE
BACTERIA URNS QL MICRO: ABNORMAL /HPF
BACTERIA URNS QL MICRO: ABNORMAL /HPF
BASOPHILS ABSOLUTE: 0.1 THOU/MM3 (ref 0–0.1)
BASOPHILS NFR BLD AUTO: 0.6 %
BILIRUB SERPL-MCNC: 0.4 MG/DL (ref 0.3–1.2)
BILIRUB UR QL STRIP.AUTO: NEGATIVE
BILIRUB UR QL STRIP.AUTO: NEGATIVE
BUN SERPL-MCNC: 12 MG/DL (ref 7–22)
CALCIUM SERPL-MCNC: 9 MG/DL (ref 8.5–10.5)
CASTS #/AREA URNS LPF: ABNORMAL /LPF
CASTS #/AREA URNS LPF: ABNORMAL /LPF
CASTS 2: ABNORMAL /LPF
CASTS 2: ABNORMAL /LPF
CHARACTER UR: ABNORMAL
CHARACTER UR: CLEAR
CHLORIDE SERPL-SCNC: 106 MEQ/L (ref 98–111)
CO2 SERPL-SCNC: 20 MEQ/L (ref 23–33)
COLOR: ABNORMAL
COLOR: ABNORMAL
CREAT SERPL-MCNC: 0.9 MG/DL (ref 0.4–1.2)
CRYSTALS URNS MICRO: ABNORMAL
CRYSTALS URNS MICRO: ABNORMAL
DEPRECATED RDW RBC AUTO: 42.4 FL (ref 35–45)
EOSINOPHIL NFR BLD AUTO: 0.7 %
EOSINOPHILS ABSOLUTE: 0.1 THOU/MM3 (ref 0–0.4)
EPITHELIAL CELLS, UA: ABNORMAL /HPF
EPITHELIAL CELLS, UA: ABNORMAL /HPF
ERYTHROCYTE [DISTWIDTH] IN BLOOD BY AUTOMATED COUNT: 12.1 % (ref 11.5–14.5)
GFR SERPL CREATININE-BSD FRML MDRD: 90 ML/MIN/1.73M2
GLUCOSE SERPL-MCNC: 112 MG/DL (ref 70–108)
GLUCOSE UR QL STRIP.AUTO: 100 MG/DL
GLUCOSE UR QL STRIP.AUTO: NEGATIVE MG/DL
HCT VFR BLD AUTO: 42.2 % (ref 37–47)
HGB BLD-MCNC: 14.6 GM/DL (ref 12–16)
HGB UR QL STRIP.AUTO: ABNORMAL
HGB UR QL STRIP.AUTO: ABNORMAL
IMM GRANULOCYTES # BLD AUTO: 0.03 THOU/MM3 (ref 0–0.07)
IMM GRANULOCYTES NFR BLD AUTO: 0.3 %
KETONES UR QL STRIP.AUTO: 15
KETONES UR QL STRIP.AUTO: NEGATIVE
LIPASE SERPL-CCNC: 26 U/L (ref 5.6–51.3)
LYMPHOCYTES ABSOLUTE: 1.9 THOU/MM3 (ref 1–4.8)
LYMPHOCYTES NFR BLD AUTO: 20.6 %
MCH RBC QN AUTO: 32.7 PG (ref 26–33)
MCHC RBC AUTO-ENTMCNC: 34.6 GM/DL (ref 32.2–35.5)
MCV RBC AUTO: 94.4 FL (ref 81–99)
MISCELLANEOUS 2: ABNORMAL
MISCELLANEOUS 2: ABNORMAL
MONOCYTES ABSOLUTE: 0.6 THOU/MM3 (ref 0.4–1.3)
MONOCYTES NFR BLD AUTO: 6.7 %
NEUTROPHILS ABSOLUTE: 6.4 THOU/MM3 (ref 1.8–7.7)
NEUTROPHILS NFR BLD AUTO: 71.1 %
NITRITE UR QL STRIP: NEGATIVE
NITRITE UR QL STRIP: POSITIVE
NRBC BLD AUTO-RTO: 0 /100 WBC
OSMOLALITY SERPL CALC.SUM OF ELEC: 274.3 MOSMOL/KG (ref 275–300)
PH UR STRIP.AUTO: 7.5 [PH] (ref 5–9)
PH UR STRIP.AUTO: 7.5 [PH] (ref 5–9)
PLATELET # BLD AUTO: 161 THOU/MM3 (ref 130–400)
PMV BLD AUTO: 10.7 FL (ref 9.4–12.4)
POTASSIUM SERPL-SCNC: 3.8 MEQ/L (ref 3.5–5.2)
PROT SERPL-MCNC: 7.2 G/DL (ref 6.1–8)
PROT UR STRIP.AUTO-MCNC: >= 300 MG/DL
PROT UR STRIP.AUTO-MCNC: NEGATIVE MG/DL
RBC # BLD AUTO: 4.47 MILL/MM3 (ref 4.2–5.4)
RBC URINE: > 200 /HPF
RBC URINE: > 200 /HPF
RENAL EPI CELLS #/AREA URNS HPF: ABNORMAL /[HPF]
RENAL EPI CELLS #/AREA URNS HPF: ABNORMAL /[HPF]
SODIUM SERPL-SCNC: 137 MEQ/L (ref 135–145)
SP GR UR REFRACT.AUTO: 1.01 (ref 1–1.03)
SP GR UR REFRACT.AUTO: 1.01 (ref 1–1.03)
UROBILINOGEN, URINE: 0.2 EU/DL (ref 0–1)
UROBILINOGEN, URINE: 4 EU/DL (ref 0–1)
WBC # BLD AUTO: 9 THOU/MM3 (ref 4.8–10.8)
WBC #/AREA URNS HPF: ABNORMAL /HPF
WBC #/AREA URNS HPF: ABNORMAL /HPF
WBC #/AREA URNS HPF: ABNORMAL /[HPF]
WBC #/AREA URNS HPF: ABNORMAL /[HPF]
YEAST LIKE FUNGI URNS QL MICRO: ABNORMAL
YEAST LIKE FUNGI URNS QL MICRO: ABNORMAL

## 2024-05-22 PROCEDURE — 76830 TRANSVAGINAL US NON-OB: CPT

## 2024-05-22 PROCEDURE — 85025 COMPLETE CBC W/AUTO DIFF WBC: CPT

## 2024-05-22 PROCEDURE — 84703 CHORIONIC GONADOTROPIN ASSAY: CPT

## 2024-05-22 PROCEDURE — 96375 TX/PRO/DX INJ NEW DRUG ADDON: CPT

## 2024-05-22 PROCEDURE — 36415 COLL VENOUS BLD VENIPUNCTURE: CPT

## 2024-05-22 PROCEDURE — 74177 CT ABD & PELVIS W/CONTRAST: CPT

## 2024-05-22 PROCEDURE — 93975 VASCULAR STUDY: CPT

## 2024-05-22 PROCEDURE — 87086 URINE CULTURE/COLONY COUNT: CPT

## 2024-05-22 PROCEDURE — 96365 THER/PROPH/DIAG IV INF INIT: CPT

## 2024-05-22 PROCEDURE — 99285 EMERGENCY DEPT VISIT HI MDM: CPT

## 2024-05-22 PROCEDURE — 2580000003 HC RX 258: Performed by: NURSE PRACTITIONER

## 2024-05-22 PROCEDURE — 80053 COMPREHEN METABOLIC PANEL: CPT

## 2024-05-22 PROCEDURE — 81001 URINALYSIS AUTO W/SCOPE: CPT

## 2024-05-22 PROCEDURE — 6360000004 HC RX CONTRAST MEDICATION: Performed by: NURSE PRACTITIONER

## 2024-05-22 PROCEDURE — 6360000002 HC RX W HCPCS: Performed by: NURSE PRACTITIONER

## 2024-05-22 PROCEDURE — 83690 ASSAY OF LIPASE: CPT

## 2024-05-22 RX ORDER — 0.9 % SODIUM CHLORIDE 0.9 %
1000 INTRAVENOUS SOLUTION INTRAVENOUS ONCE
Status: COMPLETED | OUTPATIENT
Start: 2024-05-22 | End: 2024-05-22

## 2024-05-22 RX ORDER — KETOROLAC TROMETHAMINE 30 MG/ML
30 INJECTION, SOLUTION INTRAMUSCULAR; INTRAVENOUS ONCE
Status: COMPLETED | OUTPATIENT
Start: 2024-05-22 | End: 2024-05-22

## 2024-05-22 RX ADMIN — SODIUM CHLORIDE 1000 ML: 9 INJECTION, SOLUTION INTRAVENOUS at 20:37

## 2024-05-22 RX ADMIN — IOPAMIDOL 80 ML: 755 INJECTION, SOLUTION INTRAVENOUS at 23:19

## 2024-05-22 RX ADMIN — CEFTRIAXONE SODIUM 2000 MG: 2 INJECTION, POWDER, FOR SOLUTION INTRAMUSCULAR; INTRAVENOUS at 23:54

## 2024-05-22 RX ADMIN — KETOROLAC TROMETHAMINE 30 MG: 30 INJECTION, SOLUTION INTRAMUSCULAR at 20:38

## 2024-05-22 ASSESSMENT — PAIN DESCRIPTION - LOCATION: LOCATION: ABDOMEN

## 2024-05-22 ASSESSMENT — PAIN - FUNCTIONAL ASSESSMENT
PAIN_FUNCTIONAL_ASSESSMENT: 0-10
PAIN_FUNCTIONAL_ASSESSMENT: NONE - DENIES PAIN

## 2024-05-22 ASSESSMENT — PAIN DESCRIPTION - ORIENTATION: ORIENTATION: LOWER

## 2024-05-22 ASSESSMENT — PAIN DESCRIPTION - DESCRIPTORS: DESCRIPTORS: CRAMPING

## 2024-05-22 ASSESSMENT — PAIN SCALES - GENERAL: PAINLEVEL_OUTOF10: 8

## 2024-05-22 NOTE — ED NOTES
Pt to ED c/o abdominal cramping for the last 2 weeks. Pt states she stopped taking birth control in February but recently restarted it. Pt states she has been on her period for the last 2 weeks. Pt denies N/V. Pt states she has had diarrhea and constipation. Pt hx of ovarian cysts. VSS

## 2024-05-23 PROCEDURE — 96365 THER/PROPH/DIAG IV INF INIT: CPT

## 2024-05-23 RX ORDER — CYCLOBENZAPRINE HCL 5 MG
5 TABLET ORAL 3 TIMES DAILY PRN
Qty: 15 TABLET | Refills: 0 | Status: SHIPPED | OUTPATIENT
Start: 2024-05-23 | End: 2024-05-28

## 2024-05-23 RX ORDER — SULFAMETHOXAZOLE AND TRIMETHOPRIM 800; 160 MG/1; MG/1
1 TABLET ORAL 2 TIMES DAILY
Qty: 20 TABLET | Refills: 0 | Status: SHIPPED | OUTPATIENT
Start: 2024-05-23 | End: 2024-06-02

## 2024-05-23 NOTE — ED PROVIDER NOTES
[facility] EMERGENCY DEPT      EMERGENCY MEDICINE     Pt Name: Hilary Nagel  MRN: [id number]  Birthdate 1995  Date of evaluation: 5/22/2024  Provider: FER Baker CNP    CHIEF COMPLAINT       Chief Complaint   Patient presents with    Abdominal Cramping     HISTORY OF PRESENT ILLNESS   Hilary Nagel is a pleasant 29 y.o. female who presents to the emergency department from home with c/o abdominal pain/cramping.  She restarted her BC and has now been on a menstrual cycle x 2 weeks.  Pain started then.  No n/v.  No fever.  No concern for STD.  Hx of ovarian cyst.        History is obtained from:  patient  PASTMEDICAL HISTORY   No past medical history on file.    There is no problem list on file for this patient.    SURGICAL HISTORY     No past surgical history on file.    CURRENT MEDICATIONS       Discharge Medication List as of 5/23/2024 12:32 AM        CONTINUE these medications which have NOT CHANGED    Details   BRIELLYN 0.4-35 MG-MCG per tablet TAKE 1 TABLET BY ORAL ROUTE ONCE DAILY FOR 84 DAYS               ...  (REFER TO PRESCRIPTION NOTES)., DAWHistorical Med             ALLERGIES     is allergic to pcn [penicillins].    FAMILY HISTORY     has no family status information on file.        SOCIAL HISTORY       Social History     Tobacco Use    Smoking status: Never    Smokeless tobacco: Never       PHYSICAL EXAM       ED Triage Vitals [05/22/24 1941]   BP Temp Temp Source Pulse Respirations SpO2 Height Weight   (!) 180/91 98.3 °F (36.8 °C) Oral 94 18 100 % -- --       Physical Exam  Vitals and nursing note reviewed.   Constitutional:       General: She is not in acute distress.     Appearance: Normal appearance. She is well-developed. She is obese. She is not ill-appearing or diaphoretic.   HENT:      Head: Normocephalic and atraumatic.      Jaw: There is normal jaw occlusion.      Right Ear: Hearing normal.      Left Ear: Hearing normal.      Nose: Nose normal.      Mouth/Throat:       previous records reviewed:  previous notes, admissions and hospitalizations .    Code Status: Not addressed at time of initial evaluation             See Formal Diagnostic Results above for the lab and radiology tests and orders.         3)  Treatment and Disposition         ED Reassessment/Response to interventions: Improved     NA         Case discussed with consulting clinician/attending physician:  None/None         Shared Decision-Making was performed and disposition discussed with the       Patient/Family and questions answered          Social determinants of health impacting treatment or disposition:     4) MIPS  N/A                    Medical Decision Making  Patient was seen and evaluated emergency room for lower abdominal pain, uterine bleeding and back pain.  Appropriate labs and imaging are ordered and reviewed.  Patient has endometrial hyperplasia.  This is likely the source of her heavy bleeding.  Patient was recently started on birth control.  Patient's urine is also positive for nitrites and shows significant evidence of infection.  Given a dose of IV antibiotics.  She will be started on oral antibiotics.  Discussed the importance of follow-up with her GYN to the bleeding.  Patient verbalized understanding and she is discharged home in stable condition      Vitals Reviewed:    Vitals:    05/22/24 1941 05/22/24 2011 05/22/24 2241   BP: (!) 180/91 (!) 159/92 (!) 163/78   Pulse: 94  92   Resp: 18  16   Temp: 98.3 °F (36.8 °C)     TempSrc: Oral     SpO2: 100%  98%       The patient was seen and examined. Appropriate diagnostic testing was performed and results reviewed with the patient.         The results of pertinent diagnostic studies and exam findings were discussed. The patient’s provisional diagnosis and plan of care were discussed with the patient and present family who expressed understanding and agreement with the POC. Any medications were reviewed and indications and risks of medications were

## 2024-05-24 LAB
BACTERIA UR CULT: ABNORMAL
ORGANISM: ABNORMAL

## 2024-06-27 ENCOUNTER — NURSE ONLY (OUTPATIENT)
Dept: LAB | Age: 29
End: 2024-06-27

## 2024-11-08 ENCOUNTER — LAB (OUTPATIENT)
Dept: LAB | Age: 29
End: 2024-11-08

## 2024-11-09 LAB
SOURCE: NORMAL
SOURCE: NORMAL

## 2024-11-11 LAB
SOURCE: NORMAL
TRICHOMONAS VAGINALI, MOLECULAR: NEGATIVE

## 2024-11-12 LAB
C. TRACHOMATIS DNA,THIN PREP: NEGATIVE
N. GONORRHOEAE DNA, THIN PREP: NEGATIVE
SOURCE: NORMAL

## 2024-11-16 LAB — CYTOLOGY THIN PREP PAP: NORMAL

## 2024-12-14 ENCOUNTER — HOSPITAL ENCOUNTER (OUTPATIENT)
Age: 29
Discharge: HOME | End: 2024-12-14
Payer: COMMERCIAL

## 2024-12-14 LAB
DEPRECATED MEAN GLUCOSE BLD GHB EST-ACNC: 84 MG/DL (ref 70–126)
HBA1C MFR BLD HPLC: 4.8 % (ref 4.4–6.4)
PROLACTIN SERPL-MCNC: 15.7 NG/ML
TSH SERPL DL<=0.005 MIU/L-ACNC: 1.21 UIU/ML (ref 0.4–4.2)

## 2024-12-14 PROCEDURE — 36415 COLL VENOUS BLD VENIPUNCTURE: CPT

## 2024-12-14 PROCEDURE — 84146 ASSAY OF PROLACTIN: CPT

## 2024-12-14 PROCEDURE — 84443 ASSAY THYROID STIM HORMONE: CPT

## 2024-12-14 PROCEDURE — 84270 ASSAY OF SEX HORMONE GLOBUL: CPT

## 2024-12-14 PROCEDURE — 84403 ASSAY OF TOTAL TESTOSTERONE: CPT

## 2024-12-14 PROCEDURE — 83036 HEMOGLOBIN GLYCOSYLATED A1C: CPT

## 2024-12-14 PROCEDURE — 84402 ASSAY OF FREE TESTOSTERONE: CPT

## 2024-12-15 LAB
SHBG SERPL-SCNC: 166 NMOL/L (ref 25–122)
TESTOST FREE MFR SERPL: 1.2 PG/ML (ref 0.8–7.4)
TESTOST SERPL-MCNC: 22 NG/DL (ref 8–48)